# Patient Record
Sex: MALE | Race: OTHER | Employment: OTHER | URBAN - METROPOLITAN AREA
[De-identification: names, ages, dates, MRNs, and addresses within clinical notes are randomized per-mention and may not be internally consistent; named-entity substitution may affect disease eponyms.]

---

## 2020-10-30 ENCOUNTER — HOSPITAL ENCOUNTER (EMERGENCY)
Facility: HOSPITAL | Age: 55
Discharge: HOME | End: 2020-10-30
Attending: EMERGENCY MEDICINE

## 2020-10-30 VITALS
SYSTOLIC BLOOD PRESSURE: 134 MMHG | RESPIRATION RATE: 18 BRPM | WEIGHT: 190 LBS | OXYGEN SATURATION: 97 % | BODY MASS INDEX: 34.96 KG/M2 | DIASTOLIC BLOOD PRESSURE: 81 MMHG | HEART RATE: 78 BPM | HEIGHT: 62 IN | TEMPERATURE: 98 F

## 2020-10-30 DIAGNOSIS — L60.0 INGROWN TOENAIL: Primary | ICD-10-CM

## 2020-10-30 DIAGNOSIS — E10.65 TYPE 1 DIABETES MELLITUS WITH HYPERGLYCEMIA (HCC): ICD-10-CM

## 2020-10-30 PROCEDURE — 11750 EXCISION NAIL&NAIL MATRIX: CPT

## 2020-10-30 PROCEDURE — 99283 EMERGENCY DEPT VISIT LOW MDM: CPT

## 2020-10-30 PROCEDURE — 82962 GLUCOSE BLOOD TEST: CPT

## 2020-10-30 RX ORDER — LIDOCAINE HYDROCHLORIDE AND EPINEPHRINE 20; 5 MG/ML; UG/ML
INJECTION, SOLUTION EPIDURAL; INFILTRATION; INTRACAUDAL; PERINEURAL
Status: COMPLETED
Start: 2020-10-30 | End: 2020-10-30

## 2020-10-30 RX ORDER — LIDOCAINE HYDROCHLORIDE 10 MG/ML
INJECTION, SOLUTION EPIDURAL; INFILTRATION; INTRACAUDAL; PERINEURAL
Status: COMPLETED
Start: 2020-10-30 | End: 2020-10-30

## 2020-10-30 RX ORDER — LIDOCAINE HYDROCHLORIDE 10 MG/ML
30 INJECTION, SOLUTION EPIDURAL; INFILTRATION; INTRACAUDAL; PERINEURAL ONCE
Status: COMPLETED | OUTPATIENT
Start: 2020-10-30 | End: 2020-10-30

## 2020-10-30 RX ORDER — INSULIN LISPRO 100 [IU]/ML
15 INJECTION, SOLUTION INTRAVENOUS; SUBCUTANEOUS
COMMUNITY

## 2020-10-30 RX ORDER — SULFAMETHOXAZOLE AND TRIMETHOPRIM 800; 160 MG/1; MG/1
1 TABLET ORAL 2 TIMES DAILY
Qty: 14 TABLET | Refills: 0 | Status: SHIPPED | OUTPATIENT
Start: 2020-10-30 | End: 2020-11-06

## 2020-10-30 NOTE — ED INITIAL ASSESSMENT (HPI)
Pt came in for pain to right big toe nail. Per pt he took off his right toe nail 2- 3 weeks ago. Pt has history of Diabetes on Levimir and Humalog. Pt is A/O x 4, breathing is unlabored. Pt is Arabic speaking.   Language Line Solution [id number]

## 2020-10-30 NOTE — ED NOTES
Language line used. 3 weeks with right big toe pain. Hx of diabetes. Patient notes he does not always check blood sugars. Dr Master Mejia at bedside using language line with nurse.

## 2020-10-30 NOTE — ED PROVIDER NOTES
Patient Seen in: [facility] AND [facility] Emergency Department    History   Patient presents with: Toe Pain    Stated Complaint: ingrown toenail R great toe hx: diabetic     HPI    Patient is here with 2 to 3 weeks of right first toe pain.   He has not sought a examined there is ingrown toenail on the lateral aspect of the great toe.   There is some mild to moderate surrounding redness without any fluctuance no open wound cap refill is brisk minimal tenderness noted no crepitus no redness extending beyond the nail did do  for this visit as he speaks minimal [place].   He states that he goes to a clinic he cannot remember the doctor's name      ED Course     Labs Reviewed   POCT GLUCOSE - Abnormal; Notable for the following components:       Result V

## 2020-11-02 ENCOUNTER — HOSPITAL ENCOUNTER (EMERGENCY)
Facility: HOSPITAL | Age: 55
Discharge: HOME | End: 2020-11-02
Attending: EMERGENCY MEDICINE

## 2020-11-02 VITALS
SYSTOLIC BLOOD PRESSURE: 137 MMHG | OXYGEN SATURATION: 97 % | RESPIRATION RATE: 16 BRPM | HEART RATE: 77 BPM | DIASTOLIC BLOOD PRESSURE: 89 MMHG | TEMPERATURE: 99 F

## 2020-11-02 DIAGNOSIS — L60.0 INGROWN TOENAIL OF LEFT FOOT: Primary | ICD-10-CM

## 2020-11-02 PROCEDURE — 99281 EMR DPT VST MAYX REQ PHY/QHP: CPT

## 2020-11-02 NOTE — ED NOTES
Assumed care at time of discharge. No nursing interventions needed. Okay for discharge home per MD. MD evaluation complete. Reviewed discharge information with patient, patient offered , but declined.  Patient verbalized understanding, no further

## 2020-11-03 NOTE — ED PROVIDER NOTES
Patient Seen in: [facility] AND [facility] Emergency Department    History   Patient presents with:  Note For Work    Stated Complaint: needs a work note    HPI    HPI: Luna Saenz is a 60year old male who presents for eval to return to work.   Had l great toe affect. Calm and cooperative. ED Course   Labs Reviewed - No data to display    MDM       Counseled on wide shoe and discussed warning signs that should prompt return to ER.     Disposition and Plan     Clinical Impression:  Ingrown toenail of left foot

## (undated) NOTE — LETTER
Date & Time: 11/2/2020, 5:08 PM  Patient: Kelsea Melissa  Encounter Provider(s):    Ty Gomez MD       To Whom It May Concern:    Kelsea Melissa was seen and treated in our department on 11/2/2020. He is cleared to return to work tomorrow. .    If you